# Patient Record
Sex: FEMALE | Race: WHITE
[De-identification: names, ages, dates, MRNs, and addresses within clinical notes are randomized per-mention and may not be internally consistent; named-entity substitution may affect disease eponyms.]

---

## 2017-03-11 ENCOUNTER — HOSPITAL ENCOUNTER (OUTPATIENT)
Dept: HOSPITAL 80 - FIMAGING | Age: 47
End: 2017-03-11
Attending: GENERAL ACUTE CARE HOSPITAL
Payer: COMMERCIAL

## 2017-03-11 DIAGNOSIS — Z12.31: Primary | ICD-10-CM

## 2017-03-11 DIAGNOSIS — Z80.3: ICD-10-CM

## 2017-03-11 PROCEDURE — G0202 SCR MAMMO BI INCL CAD: HCPCS

## 2017-03-17 ENCOUNTER — HOSPITAL ENCOUNTER (OUTPATIENT)
Dept: HOSPITAL 80 - FIMAGING | Age: 47
End: 2017-03-17
Attending: INTERNAL MEDICINE
Payer: COMMERCIAL

## 2017-03-17 DIAGNOSIS — Z03.89: Primary | ICD-10-CM

## 2018-03-28 ENCOUNTER — HOSPITAL ENCOUNTER (OUTPATIENT)
Dept: HOSPITAL 80 - FIMAGING | Age: 48
End: 2018-03-28
Attending: INTERNAL MEDICINE
Payer: COMMERCIAL

## 2018-03-28 DIAGNOSIS — Z80.3: ICD-10-CM

## 2018-03-28 DIAGNOSIS — Z12.31: Primary | ICD-10-CM

## 2018-03-28 DIAGNOSIS — R92.2: ICD-10-CM

## 2019-04-08 ENCOUNTER — HOSPITAL ENCOUNTER (OUTPATIENT)
Dept: HOSPITAL 80 - FIMAGING | Age: 49
End: 2019-04-08
Attending: INTERNAL MEDICINE
Payer: COMMERCIAL

## 2019-04-08 DIAGNOSIS — Z80.3: ICD-10-CM

## 2019-04-08 DIAGNOSIS — Z12.31: Primary | ICD-10-CM

## 2019-04-19 ENCOUNTER — HOSPITAL ENCOUNTER (EMERGENCY)
Dept: HOSPITAL 80 - FED | Age: 49
Discharge: HOME | End: 2019-04-19
Payer: COMMERCIAL

## 2019-04-19 VITALS — DIASTOLIC BLOOD PRESSURE: 70 MMHG | SYSTOLIC BLOOD PRESSURE: 115 MMHG

## 2019-04-19 DIAGNOSIS — I77.74: Primary | ICD-10-CM

## 2019-04-19 LAB — PLATELET # BLD: 201 10^3/UL (ref 150–400)

## 2019-04-19 NOTE — EDPHY
H & P


Stated Complaint: fall 4/18 on left side, woke up this morning with blurred 

vision on L side


Time Seen by Provider: 04/19/19 10:59


HPI/ROS: 





CHIEF COMPLAINT:  Blurry vision, neck pain





HISTORY OF PRESENT ILLNESS:  48-year-old female presents after a fall yesterday 

with blurry vision and neck pain.  She was walking her dog yesterday.  The dog 

suddenly pulled on the leash that she was holding in her left hand.  She fell 

forward, striking her shoulder on the ground and then her head.  She was able 

to get up and walked home.  Yesterday evening she noted blurry vision in her 

left eye, associated with pain around the left eye and moderate neck pain.  

This morning she awoke with increased neck pain, persistent blurry vision and 

feels that her left upper extremity is weak.  Also has a moderate headache.





REVIEW OF SYSTEMS:  complete 10 point ROS reviewed and is negative except for 

the noted elements in the HPI





Source: Patient





- Medical/Surgical History


Hx Asthma: No


Hx Chronic Respiratory Disease: No


Hx Diabetes: No


Hx Cardiac Disease: No


Hx Renal Disease: No


Hx Cirrhosis: No


Hx Alcoholism: No


Hx HIV/AIDS: No


Hx Splenectomy or Spleen Trauma: No


Other PMH: none reported





- Social History


Smoking Status: Former smoker





- Physical Exam


Exam: 





General Appearance:  Alert, pleasant


Eyes:  no conjunctival pallor or injection, no hyphema, SHARAD, EOMI


ENT, Mouth:  Mucous membranes moist


Neck:  Normal inspection, no midline tenderness, range of motion causes left 

lateral neck pain


Respiratory:  Lungs are clear to auscultation


Cardiovascular:  Regular rate and rhythm


Gastrointestinal:  Abdomen is soft and nontender


Neurological:  Alert, oriented x3, cranial nerves II through XII intact, motor 5

/5, sensory intact to light touch, normal gait


Skin:  Warm and dry, no rash


Extremities:  Nontender, no pedal edema


Psychiatric:  Mood and affect normal





Constitutional: 


 Initial Vital Signs











Temperature (C)  36.7 C   04/19/19 10:33


 


Heart Rate  78   04/19/19 10:33


 


Respiratory Rate  18   04/19/19 10:33


 


Blood Pressure  141/83 H  04/19/19 10:33


 


O2 Sat (%)  98   04/19/19 10:33








 











O2 Delivery Mode               Room Air














Allergies/Adverse Reactions: 


 





No Known Allergies Allergy (Unverified 04/19/19 10:32)


 








Home Medications: 














 Medication  Instructions  Recorded


 


NK [No Known Home Meds]  04/19/19














Medical Decision Making





- Diagnostics


Imaging Results: 


 Imaging Impressions





Head CT  04/19/19 12:16


Impression: No acute intracranial findings.


 


Findings discussed with JAGRUTI MENESES 4/19/2019 at 14:39. 


 


 








Head CTA  04/19/19 12:16


Impression:


1. Contour irregularity of the proximal left vertebral artery suspicious for 

short segment nonocclusive dissection flap/pseudoaneurysm, although this could 

less likely be related to artifact from vessel tortuosity.


2. Additional findings as above.


 


Stenoses are calculated using [employer] Symptomatic Carotid Endarterectomy 

Trial (NASCET) criteria.


 


Findings discussed with Dr. Jagruti Meneses on 4/19/2019 at 14:39. 


 


 


 


 


 








Neck CTA  04/19/19 12:16


Impression:


1. Contour irregularity of the proximal left vertebral artery suspicious for 

short segment nonocclusive dissection flap/pseudoaneurysm, although this could 

less likely be related to artifact from vessel tortuosity.


2. Additional findings as above.


 


Stenoses are calculated using [employer] Symptomatic Carotid Endarterectomy 

Trial (NASCET) criteria.


 


Findings discussed with Dr. Jagruti Meneses on 4/19/2019 at 14:39. 


 


 


 


 


 











Imaging: Discussed imaging studies w/ On call Radiologist


ED Course/Re-evaluation: 





This patient presents after a fall with headache, neck pain blurry vision and 

left upper extremity weakness.  Visual acuity left eye is 20/40.  Neurologic 

exam including strength in the left upper extremity is normal.  Given 

constellation of symptoms, concern for carotid dissection.  Will obtain CT/CT a 

of the brain and neck.  If these tests are normal, will refer to an 

ophthalmologist for further evaluation.  There is no evidence of direct eye 

trauma or intraocular injury.





3:00 p.m.-CT/CTA of the head and neck read by Dr. Givens reveals a subtle 

left vertebral artery dissection.  Consulted Dr. Burger, suggests MRI brain to r/

o CVA.  If negative, place on ASA 81mg daily and f/u in office.  d/w pt, 

refuses MRI.  Tells me that her arm isn't weak, just painful.  Tried to 

encourage her to have MRI, understands concern for CVA, declines MRI.  Warning 

signs discussed.  Will f/u neuro.  Has an appt tomorrow with an ophthalmologist.


 





- Data Points


Laboratory Results: 


 Laboratory Results





 04/19/19 13:10 





 04/19/19 13:10 





 











  04/19/19 04/19/19 04/19/19





  13:17 13:10 13:10


 


WBC      6.04 10^3/uL 10^3/uL





     (3.80-9.50) 


 


RBC      4.31 10^6/uL 10^6/uL





     (4.18-5.33) 


 


Hgb      14.6 g/dL g/dL





     (12.6-16.3) 


 


POC Hgb  14.3 gm/dL gm/dL    





   (12.6-16.3)   


 


Hct      42.7 % %





     (38.0-47.0) 


 


POC Hct  42 % %    





   (38-47)   


 


MCV      99.1 fL fL





     (81.5-99.8) 


 


MCH      33.9 pg pg





     (27.9-34.1) 


 


MCHC      34.2 g/dL g/dL





     (32.4-36.7) 


 


RDW      12.8 % %





     (11.5-15.2) 


 


Plt Count      201 10^3/uL 10^3/uL





     (150-400) 


 


MPV      10.4 fL fL





     (8.7-11.7) 


 


Neut % (Auto)      72.3 % %





     (39.3-74.2) 


 


Lymph % (Auto)      21.9 % %





     (15.0-45.0) 


 


Mono % (Auto)      4.6 % %





     (4.5-13.0) 


 


Eos % (Auto)      0.2 % L %





     (0.6-7.6) 


 


Baso % (Auto)      0.7 % %





     (0.3-1.7) 


 


Nucleat RBC Rel Count      0.0 % %





     (0.0-0.2) 


 


Absolute Neuts (auto)      4.37 10^3/uL 10^3/uL





     (1.70-6.50) 


 


Absolute Lymphs (auto)      1.32 10^3/uL 10^3/uL





     (1.00-3.00) 


 


Absolute Monos (auto)      0.28 10^3/uL L 10^3/uL





     (0.30-0.80) 


 


Absolute Eos (auto)      0.01 10^3/uL L 10^3/uL





     (0.03-0.40) 


 


Absolute Basos (auto)      0.04 10^3/uL 10^3/uL





     (0.02-0.10) 


 


Absolute Nucleated RBC      0.00 10^3/uL 10^3/uL





     (0-0.01) 


 


Immature Gran %      0.3 % %





     (0.0-1.1) 


 


Immature Gran #      0.02 10^3/uL 10^3/uL





     (0.00-0.10) 


 


POC Sodium  143 mEq/L mEq/L    





   (135-145)   


 


Sodium    138 mEq/L mEq/L  





    (135-145)  


 


POC Potassium  3.7 mEq/L mEq/L    





   (3.3-5.0)   


 


Potassium    3.7 mEq/L mEq/L  





    (3.5-5.2)  


 


POC Chloride  104 mEq/L mEq/L    





   ()   


 


Chloride    104 mEq/L mEq/L  





    ()  


 


Carbon Dioxide    27 mEq/l mEq/l  





    (22-31)  


 


POC Total CO2  26 mEq/L mEq/L    





   (22-31)   


 


Anion Gap    7 mEq/L mEq/L  





    (6-14)  


 


POC BUN  20 mg/dL mg/dL    





   (7-23)   


 


BUN    21 mg/dL mg/dL  





    (7-23)  


 


Creatinine    0.7 mg/dL mg/dL  





    (0.6-1.0)  


 


POC Creatinine  0.7 mg/dL mg/dL    





   (0.6-1.0)   


 


Estimated GFR    > 60   





    


 


Glucose    97 mg/dL mg/dL  





    ()  


 


POC Glucose  103 mg/dL H mg/dL    





   ()   


 


Calcium    9.6 mg/dL mg/dL  





    (8.5-10.4)  











Point of Care Test Results: 


 Chemistry











  04/19/19





  13:17


 


POC Sodium  143 mEq/L mEq/L





   (135-145) 


 


POC Potassium  3.7 mEq/L mEq/L





   (3.3-5.0) 


 


POC Chloride  104 mEq/L mEq/L





   () 


 


POC Total CO2  26 mEq/L mEq/L





   (22-31) 


 


POC BUN  20 mg/dL mg/dL





   (7-23) 


 


POC Creatinine  0.7 mg/dL mg/dL





   (0.6-1.0) 


 


POC Glucose  103 mg/dL H mg/dL





   () 








 ISTAT H&H











  04/19/19





  13:17


 


POC Hgb  14.3 gm/dL gm/dL





   (12.6-16.3) 


 


POC Hct  42 % %





   (38-47) 














Departure





- Departure


Disposition: Home, Routine, Self-Care


Clinical Impression: 


 Vertebral artery dissection





Condition: Good


Instructions:  Additional Information


Additional Instructions: 


You have a vertebral artery dissection.  The treatment is Aspirin 81mg daily (

lifelong).


Follow-up with Dr. Burger in the office.


Return for worsening symptoms or any concerns.





Referrals: 


Crystal Chen MD [Primary Care Provider] - As per Instructions


Steven Burger DO [Medical Doctor] - As per Instructions